# Patient Record
Sex: FEMALE | Race: WHITE
[De-identification: names, ages, dates, MRNs, and addresses within clinical notes are randomized per-mention and may not be internally consistent; named-entity substitution may affect disease eponyms.]

---

## 2020-09-04 ENCOUNTER — HOSPITAL ENCOUNTER (OUTPATIENT)
Age: 65
End: 2020-09-04
Payer: MEDICARE

## 2020-09-04 DIAGNOSIS — Z12.31: Primary | ICD-10-CM

## 2020-09-04 PROCEDURE — 77063 BREAST TOMOSYNTHESIS BI: CPT

## 2020-09-04 PROCEDURE — 77067 SCR MAMMO BI INCL CAD: CPT

## 2021-06-27 ENCOUNTER — HOSPITAL ENCOUNTER (EMERGENCY)
Age: 66
Discharge: HOME | End: 2021-06-27
Payer: MEDICARE

## 2021-06-27 VITALS
TEMPERATURE: 98.78 F | DIASTOLIC BLOOD PRESSURE: 87 MMHG | OXYGEN SATURATION: 100 % | RESPIRATION RATE: 20 BRPM | SYSTOLIC BLOOD PRESSURE: 158 MMHG | HEART RATE: 84 BPM

## 2021-06-27 DIAGNOSIS — N30.00: Primary | ICD-10-CM

## 2021-06-27 DIAGNOSIS — E03.9: ICD-10-CM

## 2021-06-27 PROCEDURE — G0463 HOSPITAL OUTPT CLINIC VISIT: HCPCS

## 2021-06-27 PROCEDURE — 87086 URINE CULTURE/COLONY COUNT: CPT

## 2021-06-27 PROCEDURE — 99213 OFFICE O/P EST LOW 20 MIN: CPT

## 2021-06-27 PROCEDURE — 81003 URINALYSIS AUTO W/O SCOPE: CPT

## 2021-06-29 ENCOUNTER — HOSPITAL ENCOUNTER (OUTPATIENT)
Age: 66
Discharge: HOME | End: 2021-06-29
Payer: MEDICARE

## 2021-06-29 DIAGNOSIS — R20.2: Primary | ICD-10-CM

## 2021-06-29 DIAGNOSIS — M47.896: ICD-10-CM

## 2021-06-29 PROCEDURE — 72148 MRI LUMBAR SPINE W/O DYE: CPT

## 2021-07-09 ENCOUNTER — HOSPITAL ENCOUNTER (OUTPATIENT)
Age: 66
End: 2021-07-09
Payer: MEDICARE

## 2021-07-09 DIAGNOSIS — R10.2: Primary | ICD-10-CM

## 2021-07-09 PROCEDURE — 76856 US EXAM PELVIC COMPLETE: CPT

## 2021-07-09 PROCEDURE — 76830 TRANSVAGINAL US NON-OB: CPT

## 2021-08-03 ENCOUNTER — HOSPITAL ENCOUNTER (OUTPATIENT)
Age: 66
End: 2021-08-03
Payer: MEDICARE

## 2021-08-03 DIAGNOSIS — M81.0: Primary | ICD-10-CM

## 2021-08-03 DIAGNOSIS — E03.9: ICD-10-CM

## 2021-08-03 PROCEDURE — 77080 DXA BONE DENSITY AXIAL: CPT

## 2021-08-03 PROCEDURE — 76536 US EXAM OF HEAD AND NECK: CPT

## 2021-09-07 ENCOUNTER — HOSPITAL ENCOUNTER (OUTPATIENT)
Age: 66
End: 2021-09-07
Payer: MEDICARE

## 2021-09-07 DIAGNOSIS — Z12.31: Primary | ICD-10-CM

## 2021-09-07 PROCEDURE — 77067 SCR MAMMO BI INCL CAD: CPT

## 2021-09-07 PROCEDURE — 77063 BREAST TOMOSYNTHESIS BI: CPT

## 2022-03-10 ENCOUNTER — APPOINTMENT (RX ONLY)
Dept: URBAN - METROPOLITAN AREA CLINIC 53 | Facility: CLINIC | Age: 67
Setting detail: DERMATOLOGY
End: 2022-03-10

## 2022-03-10 DIAGNOSIS — L82.0 INFLAMED SEBORRHEIC KERATOSIS: ICD-10-CM | Status: INADEQUATELY CONTROLLED

## 2022-03-10 DIAGNOSIS — B07.8 OTHER VIRAL WARTS: ICD-10-CM | Status: INADEQUATELY CONTROLLED

## 2022-03-10 DIAGNOSIS — L82.1 OTHER SEBORRHEIC KERATOSIS: ICD-10-CM | Status: INADEQUATELY CONTROLLED

## 2022-03-10 DIAGNOSIS — D485 NEOPLASM OF UNCERTAIN BEHAVIOR OF SKIN: ICD-10-CM

## 2022-03-10 DIAGNOSIS — L20.89 OTHER ATOPIC DERMATITIS: ICD-10-CM | Status: INADEQUATELY CONTROLLED

## 2022-03-10 DIAGNOSIS — D18.0 HEMANGIOMA: ICD-10-CM

## 2022-03-10 PROBLEM — D18.01 HEMANGIOMA OF SKIN AND SUBCUTANEOUS TISSUE: Status: ACTIVE | Noted: 2022-03-10

## 2022-03-10 PROBLEM — D48.5 NEOPLASM OF UNCERTAIN BEHAVIOR OF SKIN: Status: ACTIVE | Noted: 2022-03-10

## 2022-03-10 PROCEDURE — ? PRESCRIPTION

## 2022-03-10 PROCEDURE — ? COUNSELING

## 2022-03-10 PROCEDURE — ? BENIGN DESTRUCTION

## 2022-03-10 PROCEDURE — ? SHAVE REMOVAL

## 2022-03-10 PROCEDURE — 99203 OFFICE O/P NEW LOW 30 MIN: CPT | Mod: 25

## 2022-03-10 PROCEDURE — 17110 DESTRUCTION B9 LES UP TO 14: CPT

## 2022-03-10 PROCEDURE — 11305 SHAVE SKIN LESION 0.5 CM/<: CPT | Mod: 59

## 2022-03-10 PROCEDURE — ? LIQUID NITROGEN

## 2022-03-10 RX ORDER — GENTAMICIN SULFATE 1 MG/G
OINTMENT TOPICAL ONCE DAILY
Qty: 15 | Refills: 0 | Status: ERX | COMMUNITY
Start: 2022-03-10

## 2022-03-10 RX ORDER — AMMONIUM LACTATE 12 G/100G
LOTION TOPICAL
Qty: 225 | Refills: 0 | Status: ERX | COMMUNITY
Start: 2022-03-10

## 2022-03-10 RX ORDER — HALOBETASOL PROPIONATE 0.5 MG/G
CREAM TOPICAL
Qty: 15 | Refills: 0 | Status: ERX | COMMUNITY
Start: 2022-03-10

## 2022-03-10 RX ADMIN — GENTAMICIN SULFATE: 1 OINTMENT TOPICAL at 00:00

## 2022-03-10 RX ADMIN — AMMONIUM LACTATE: 12 LOTION TOPICAL at 00:00

## 2022-03-10 RX ADMIN — HALOBETASOL PROPIONATE: 0.5 CREAM TOPICAL at 00:00

## 2022-03-10 ASSESSMENT — LOCATION SIMPLE DESCRIPTION DERM
LOCATION SIMPLE: LEFT CHEEK
LOCATION SIMPLE: RIGHT ANTERIOR NECK
LOCATION SIMPLE: LEFT KNEE
LOCATION SIMPLE: LEFT POSTERIOR THIGH

## 2022-03-10 ASSESSMENT — LOCATION DETAILED DESCRIPTION DERM
LOCATION DETAILED: LEFT KNEE
LOCATION DETAILED: RIGHT INFERIOR LATERAL NECK
LOCATION DETAILED: LEFT DISTAL POSTERIOR THIGH
LOCATION DETAILED: LEFT SUPERIOR LATERAL MALAR CHEEK

## 2022-03-10 ASSESSMENT — LOCATION ZONE DERM
LOCATION ZONE: LEG
LOCATION ZONE: FACE
LOCATION ZONE: NECK

## 2022-03-10 NOTE — PROCEDURE: MIPS QUALITY
Quality 226: Preventive Care And Screening: Tobacco Use: Screening And Cessation Intervention: Tobacco Screening not Performed for Unknown Reasons
Detail Level: Detailed
Quality 431: Preventive Care And Screening: Unhealthy Alcohol Use - Screening: Patient not identified as an unhealthy alcohol user when screened for unhealthy alcohol use using a systematic screening method
Quality 110: Preventive Care And Screening: Influenza Immunization: Influenza immunization was not ordered or administered, reason not given

## 2022-03-10 NOTE — PROCEDURE: LIQUID NITROGEN
Render Post Care In The Note?: yes
Total Number Of Lesions Treated: 1
Include Z78.9 (Other Specified Conditions Influencing Health Status) As An Associated Diagnosis?: No
Post-Care Instructions: I reviewed with the patient in detail post-care instructions. Patient is to wear sunprotection, and avoid picking at any of the treated lesions. Pt may apply Vaseline to crusted or scabbing areas.
Number Of Freeze-Thaw Cycles: 2 freeze-thaw cycles
Detail Level: Zone
Spray Paint Text: The liquid nitrogen was applied to the skin utilizing a spray paint frosting technique.
Duration Of Freeze Thaw-Cycle (Seconds): 0
Medical Necessity Information: It is in your best interest to select a reason for this procedure from the list below. All of these items fulfill various CMS LCD requirements except the new and changing color options.
Consent: The patient's consent was obtained including but not limited to risks of crusting, scabbing, blistering, scarring, darker or lighter pigmentary change, recurrence, incomplete removal and infection.
Medical Necessity Clause: This procedure was medically necessary because the lesions that were treated were: bleeding, irritated, growing , catching on clothing

## 2022-03-10 NOTE — PROCEDURE: BENIGN DESTRUCTION
Add 52 Modifier (Optional): no
Consent: The patient's consent was obtained including but not limited to risks of crusting, scabbing, blistering, scarring, darker or lighter pigmentary change, recurrence, incomplete removal and infection.
Detail Level: Zone
Medical Necessity Information: It is in your best interest to select a reason for this procedure from the list below. All of these items fulfill various CMS LCD requirements except the new and changing color options.
Anesthesia Volume In Cc: 0.5
Medical Necessity Clause: This procedure was medically necessary because the lesions that were treated were:
Treatment Number (Will Not Render If 0): 0
Render Post-Care Instructions In Note?: yes
Post-Care Instructions: I reviewed with the patient in detail post-care instructions. Patient is to wear sunprotection, and avoid picking at any of the treated lesions. Pt may apply Vaseline to crusted or scabbing areas.

## 2022-03-24 ENCOUNTER — APPOINTMENT (RX ONLY)
Dept: URBAN - METROPOLITAN AREA CLINIC 53 | Facility: CLINIC | Age: 67
Setting detail: DERMATOLOGY
End: 2022-03-24

## 2022-03-24 DIAGNOSIS — D22 MELANOCYTIC NEVI: ICD-10-CM

## 2022-03-24 DIAGNOSIS — L91.8 OTHER HYPERTROPHIC DISORDERS OF THE SKIN: ICD-10-CM

## 2022-03-24 DIAGNOSIS — L57.0 ACTINIC KERATOSIS: ICD-10-CM

## 2022-03-24 PROBLEM — D22.9 MELANOCYTIC NEVI, UNSPECIFIED: Status: ACTIVE | Noted: 2022-03-24

## 2022-03-24 PROCEDURE — 99213 OFFICE O/P EST LOW 20 MIN: CPT | Mod: 25

## 2022-03-24 PROCEDURE — ? PATHOLOGY DISCUSSION

## 2022-03-24 PROCEDURE — ? SKIN TAG REMOVAL

## 2022-03-24 PROCEDURE — 11200 RMVL SKIN TAGS UP TO&INC 15: CPT

## 2022-03-24 PROCEDURE — ? COUNSELING

## 2022-03-24 ASSESSMENT — LOCATION DETAILED DESCRIPTION DERM: LOCATION DETAILED: RIGHT CENTRAL EYEBROW

## 2022-03-24 ASSESSMENT — LOCATION ZONE DERM: LOCATION ZONE: FACE

## 2022-03-24 ASSESSMENT — LOCATION SIMPLE DESCRIPTION DERM: LOCATION SIMPLE: RIGHT EYEBROW

## 2022-03-24 NOTE — PROCEDURE: MIPS QUALITY
Quality 431: Preventive Care And Screening: Unhealthy Alcohol Use - Screening: Patient not identified as an unhealthy alcohol user when screened for unhealthy alcohol use using a systematic screening method
Detail Level: Generalized
Quality 110: Preventive Care And Screening: Influenza Immunization: Influenza immunization was not ordered or administered, reason not given
Quality 226: Preventive Care And Screening: Tobacco Use: Screening And Cessation Intervention: Tobacco Screening not Performed for Unknown Reasons

## 2022-03-24 NOTE — PROCEDURE: SKIN TAG REMOVAL
Medical Necessity Information: It is in your best interest to select a reason for this procedure from the list below. All of these items fulfill various CMS LCD requirements except the new and changing color options.
Hemostasis: Drysol
Detail Level: Detailed
Include Z78.9 (Other Specified Conditions Influencing Health Status) As An Associated Diagnosis?: No
Consent: Written consent obtained and the risks of skin tag removal was reviewed with the patient including but not limited to bleeding, pigmentary change, infection, pain, and remote possibility of scarring.
Anesthesia Type: 1% lidocaine with epinephrine
Medical Necessity Clause: This procedure was medically necessary because the lesions that were treated were: itchy and catching on patient's clothing
Add Associated Diagnoses If Applicable When Selecting Medical Necessity: Yes
Anesthesia Volume In Cc: 0.5

## 2023-07-28 ENCOUNTER — HOSPITAL ENCOUNTER (OUTPATIENT)
Age: 68
End: 2023-07-28
Payer: MEDICARE

## 2023-07-28 DIAGNOSIS — Z12.31: Primary | ICD-10-CM

## 2023-07-28 PROCEDURE — 77063 BREAST TOMOSYNTHESIS BI: CPT

## 2023-07-28 PROCEDURE — 77067 SCR MAMMO BI INCL CAD: CPT

## 2023-11-24 ENCOUNTER — HOSPITAL ENCOUNTER (OUTPATIENT)
Age: 68
End: 2023-11-24
Payer: MEDICARE

## 2023-11-24 DIAGNOSIS — M81.0: Primary | ICD-10-CM

## 2023-11-24 PROCEDURE — 77080 DXA BONE DENSITY AXIAL: CPT

## 2023-12-08 ENCOUNTER — HOSPITAL ENCOUNTER (OUTPATIENT)
Dept: HOSPITAL 102 - ANHED | Age: 68
Setting detail: OBSERVATION
LOS: 1 days | Discharge: HOME | End: 2023-12-09
Payer: MEDICARE

## 2023-12-08 VITALS — RESPIRATION RATE: 15 BRPM | HEART RATE: 83 BPM | OXYGEN SATURATION: 95 %

## 2023-12-08 VITALS
SYSTOLIC BLOOD PRESSURE: 158 MMHG | HEART RATE: 104 BPM | DIASTOLIC BLOOD PRESSURE: 82 MMHG | OXYGEN SATURATION: 98 % | RESPIRATION RATE: 20 BRPM

## 2023-12-08 VITALS
DIASTOLIC BLOOD PRESSURE: 86 MMHG | RESPIRATION RATE: 14 BRPM | HEART RATE: 94 BPM | OXYGEN SATURATION: 98 % | SYSTOLIC BLOOD PRESSURE: 154 MMHG

## 2023-12-08 VITALS — RESPIRATION RATE: 14 BRPM | HEART RATE: 86 BPM | OXYGEN SATURATION: 97 %

## 2023-12-08 VITALS — OXYGEN SATURATION: 98 % | RESPIRATION RATE: 16 BRPM | HEART RATE: 97 BPM

## 2023-12-08 VITALS
HEART RATE: 104 BPM | DIASTOLIC BLOOD PRESSURE: 95 MMHG | OXYGEN SATURATION: 100 % | RESPIRATION RATE: 16 BRPM | SYSTOLIC BLOOD PRESSURE: 171 MMHG | TEMPERATURE: 98.06 F

## 2023-12-08 VITALS — OXYGEN SATURATION: 99 % | HEART RATE: 107 BPM | RESPIRATION RATE: 22 BRPM

## 2023-12-08 VITALS
HEART RATE: 83 BPM | SYSTOLIC BLOOD PRESSURE: 128 MMHG | DIASTOLIC BLOOD PRESSURE: 69 MMHG | OXYGEN SATURATION: 97 % | RESPIRATION RATE: 17 BRPM

## 2023-12-08 VITALS — RESPIRATION RATE: 15 BRPM | HEART RATE: 82 BPM | OXYGEN SATURATION: 95 %

## 2023-12-08 VITALS — DIASTOLIC BLOOD PRESSURE: 70 MMHG | SYSTOLIC BLOOD PRESSURE: 155 MMHG | HEART RATE: 70 BPM

## 2023-12-08 VITALS — OXYGEN SATURATION: 99 % | RESPIRATION RATE: 13 BRPM | HEART RATE: 95 BPM

## 2023-12-08 VITALS
DIASTOLIC BLOOD PRESSURE: 76 MMHG | SYSTOLIC BLOOD PRESSURE: 155 MMHG | OXYGEN SATURATION: 97 % | HEART RATE: 103 BPM | RESPIRATION RATE: 15 BRPM

## 2023-12-08 VITALS — RESPIRATION RATE: 14 BRPM | OXYGEN SATURATION: 99 % | HEART RATE: 73 BPM

## 2023-12-08 VITALS — OXYGEN SATURATION: 98 % | HEART RATE: 98 BPM | RESPIRATION RATE: 12 BRPM

## 2023-12-08 VITALS — OXYGEN SATURATION: 97 % | RESPIRATION RATE: 21 BRPM | HEART RATE: 97 BPM

## 2023-12-08 VITALS
SYSTOLIC BLOOD PRESSURE: 175 MMHG | DIASTOLIC BLOOD PRESSURE: 99 MMHG | HEART RATE: 103 BPM | OXYGEN SATURATION: 100 % | RESPIRATION RATE: 16 BRPM | TEMPERATURE: 98 F

## 2023-12-08 VITALS — OXYGEN SATURATION: 95 % | RESPIRATION RATE: 12 BRPM | HEART RATE: 88 BPM

## 2023-12-08 VITALS — OXYGEN SATURATION: 99 % | HEART RATE: 108 BPM | RESPIRATION RATE: 15 BRPM

## 2023-12-08 VITALS — OXYGEN SATURATION: 99 % | HEART RATE: 96 BPM | RESPIRATION RATE: 14 BRPM

## 2023-12-08 VITALS — BODY MASS INDEX: 21.4 KG/M2

## 2023-12-08 DIAGNOSIS — R03.0: ICD-10-CM

## 2023-12-08 DIAGNOSIS — Z20.822: ICD-10-CM

## 2023-12-08 DIAGNOSIS — Z79.899: ICD-10-CM

## 2023-12-08 DIAGNOSIS — M81.0: ICD-10-CM

## 2023-12-08 DIAGNOSIS — R42: Primary | ICD-10-CM

## 2023-12-08 DIAGNOSIS — R94.31: ICD-10-CM

## 2023-12-08 DIAGNOSIS — E03.9: ICD-10-CM

## 2023-12-08 DIAGNOSIS — Z79.890: ICD-10-CM

## 2023-12-08 LAB
ADD MANUAL DIFF: NO
ADD URINE MICROSCOPIC?: YES
ALANINE AMINOTRANSFERASE: 14 U/L (ref 6–35)
ALBUMIN LEVEL: 4.6 G/DL (ref 3.5–5.1)
ALBUMIN SPEC-MCNC: 4.6 G/DL (ref 3.5–5.1)
ALKALINE PHOSPHATASE: 81 U/L (ref 38–126)
ALP SERPL-CCNC: 81 U/L (ref 38–126)
ALT SERPL-CCNC: 14 U/L (ref 6–35)
ANION GAP: 8 MMOL/L (ref 8–16)
ASPARTATE AMINO TRANSFERASE: 23 U/L (ref 14–36)
AST SERPL-CCNC: 23 U/L (ref 14–36)
BASIC METABOLIC AND HEMATOCRIT PNL BLD: 39.9 % (ref 37–47)
BILIRUBIN,TOTAL: 0.6 MG/DL (ref 0.2–1.3)
BLOOD UREA NITROGEN: 10 MG/DL (ref 7–17)
BUN SERPL-MCNC: 10 MG/DL (ref 7–17)
CALCIUM: 9 MG/DL (ref 8.4–10.2)
CARBON DIOXIDE: 24 MMOL/L (ref 22–30)
CASTS #/AREA URNS HPF: (no result) /[HPF]
CHLORIDE SPEC-MCNC: 104 MMOL/L (ref 98–107)
CHLORIDE: 104 MMOL/L (ref 98–107)
CREAT CL PREDICTED SERPL C-G-VRATE: 67 ML/MIN
ESTIMATED CRCL CALCULATION: 67 ML/MIN
ESTIMATED GLOMERULAR FILT RATE: > 60
GFRSERPLBLD MDRD-ARVRAT: > 60 ML/MIN/{1.73_M2}
GLUCOSE SERPL-MCNC: 118 MG/DL (ref 65–110)
GLUCOSE: 118 MG/DL (ref 65–110)
HEMATOCRIT: 39.9 % (ref 37–47)
HEMOGLOBIN: 12.9 G/DL (ref 12–15)
HGB+HCT PNL BLD: 39.9 % (ref 37–47)
IMM GRANULOCYTES NFR BLD AUTO: 0.2 % (ref 0–0.5)
IPF CHARGE: (no result)
LYMPHOCYTES # SPEC AUTO: 1.11 K/MM3 (ref 0.9–3.2)
MAGNESIUM: 2.1 MG/DL (ref 1.6–2.3)
MCV RBC: 91.5 FL (ref 80–100)
MEAN CORPUSCULAR HEMOGLOBIN: 29.6 PG (ref 26–34)
MEAN CORPUSCULAR HGB CONC: 32.3 G/DL (ref 32–36)
MICRO URNS: YES
NUCLEATED RED BLOOD CELLS ABSOLUTE AUTO: 0 K/MM3 (ref 0–0.01)
NUCLEATED RED BLOOD CELLS PERC: 0 % (ref 0–0.2)
PLATELET COUNT RESULT: 252 K/MM3 (ref 150–375)
POTASSIUM: 3.4 MMOL/L (ref 3.4–5)
PROT SERPL-MCNC: 8 G/DL (ref 6.3–8.2)
RED BLOOD COUNT: 4.36 M/MM3 (ref 4.2–5.4)
SODIUM: 136 MMOL/L (ref 137–145)
SP GR UR: 1.01 (ref 1–1.03)
TOTAL PROTEIN: 8 G/DL (ref 6.3–8.2)
WHITE BLOOD COUNT: 6.1 K/MM3 (ref 4.5–10)

## 2023-12-08 PROCEDURE — 80053 COMPREHEN METABOLIC PANEL: CPT

## 2023-12-08 PROCEDURE — 70498 CT ANGIOGRAPHY NECK: CPT

## 2023-12-08 PROCEDURE — 87637 SARSCOV2&INF A&B&RSV AMP PRB: CPT

## 2023-12-08 PROCEDURE — 85025 COMPLETE CBC W/AUTO DIFF WBC: CPT

## 2023-12-08 PROCEDURE — 36415 COLL VENOUS BLD VENIPUNCTURE: CPT

## 2023-12-08 PROCEDURE — 96361 HYDRATE IV INFUSION ADD-ON: CPT

## 2023-12-08 PROCEDURE — A9577 INJ MULTIHANCE: HCPCS

## 2023-12-08 PROCEDURE — 96374 THER/PROPH/DIAG INJ IV PUSH: CPT

## 2023-12-08 PROCEDURE — 70496 CT ANGIOGRAPHY HEAD: CPT

## 2023-12-08 PROCEDURE — 99285 EMERGENCY DEPT VISIT HI MDM: CPT

## 2023-12-08 PROCEDURE — 70553 MRI BRAIN STEM W/O & W/DYE: CPT

## 2023-12-08 PROCEDURE — G0378 HOSPITAL OBSERVATION PER HR: HCPCS

## 2023-12-08 PROCEDURE — 83735 ASSAY OF MAGNESIUM: CPT

## 2023-12-08 PROCEDURE — A9270 NON-COVERED ITEM OR SERVICE: HCPCS

## 2023-12-08 PROCEDURE — 81001 URINALYSIS AUTO W/SCOPE: CPT

## 2023-12-08 PROCEDURE — 93005 ELECTROCARDIOGRAM TRACING: CPT

## 2023-12-08 PROCEDURE — 70450 CT HEAD/BRAIN W/O DYE: CPT

## 2023-12-08 RX ADMIN — DIAZEPAM 5 MG: 5 TABLET ORAL at 21:17

## 2023-12-08 RX ADMIN — SODIUM CHLORIDE 999 ML: 900 INJECTION, SOLUTION INTRAVENOUS at 20:07

## 2023-12-08 RX ADMIN — SODIUM CHLORIDE 999 ML: 900 INJECTION, SOLUTION INTRAVENOUS at 18:17

## 2023-12-08 RX ADMIN — MECLIZINE HYDROCHLORIDE 25 MG: 25 TABLET ORAL at 18:17

## 2023-12-08 RX ADMIN — ONDANSETRON 4 MG: 2 INJECTION INTRAMUSCULAR; INTRAVENOUS at 18:17

## 2023-12-09 VITALS
DIASTOLIC BLOOD PRESSURE: 72 MMHG | OXYGEN SATURATION: 100 % | SYSTOLIC BLOOD PRESSURE: 137 MMHG | HEART RATE: 68 BPM | RESPIRATION RATE: 13 BRPM

## 2023-12-09 VITALS
RESPIRATION RATE: 20 BRPM | OXYGEN SATURATION: 98 % | HEART RATE: 69 BPM | TEMPERATURE: 97.8 F | DIASTOLIC BLOOD PRESSURE: 62 MMHG | SYSTOLIC BLOOD PRESSURE: 138 MMHG

## 2023-12-09 VITALS — HEART RATE: 65 BPM

## 2023-12-09 VITALS — HEART RATE: 92 BPM

## 2023-12-09 RX ADMIN — LEVOTHYROXINE SODIUM 25 MCG: 25 TABLET ORAL at 09:29

## 2023-12-09 RX ADMIN — MECLIZINE HYDROCHLORIDE 12.5 MG: 12.5 TABLET ORAL at 09:29

## 2024-10-08 NOTE — WPDANESEPPF
Anes - Initial Pre Proc Eval
Procedure: 


Operation Date: 10/09/24 07:30
Proposed Procedures
p Screening Colonoscopy - Jhon Monge MD


Date/Time: 
10/08/24  15:31

Surgeon: 
Jhon Monge MD

Pre Op Diagnosis: Neoplasm screening
Patient Data
Age: 69
Gender: F
Height: 1.63 m
Weight: 59 kg

Allergies

Allergy/AdvReac Type Severity Reaction Status Date / Time
codeine Allergy Severe TACHYCARDIA Verified 10/09/24 06:16


Home Medications

 Medication  Instructions  Recorded  Confirmed  Type
levothyroxine 25 mcg tablet 25 mcg PO DAILY 05/16/23 10/09/24 History
estradiol 0.01% (0.1 mg/gram) 1 g vaginal 2XW #42.5 grams 08/13/24 10/09/24 Rx
vaginal cream    


Patient hx anesthesia problems: none
Family hx anesthesia problems: none
Results Review: 
All pre-operative results and documents have been reviewed as part of the pre-operative evaluation.


[facility]
Past Medical History
Medical History (Updated 12/09/23 @ 10:51 by Mery Willis PA-C)

Hypothyroidism
Osteoporosis


Surgical History
Surgical History (Reviewed 12/09/23 @ 10:50 by Mery Willis PA-C)

Finger joint replacement of right hand
 Right thumb
H/O: hysterectomy
History of appendectomy
History of orthopedic surgery
 Left wrist
History of tonsillectomy
Hx of hernia repair


Family History
Family History (Reviewed 12/09/23 @ 10:50 by Mery Willis PA-C)
Mother
 Breast cancer
Father
 Carcinoma of colon
 Heart disease


Social History
Social History (Reviewed 12/09/23 @ 10:50 by Mery Willis PA-C)
Smoking status:  Former smoker 
Second hand tobacco smoke exposure:  No 
Alcohol intake:  never 
Substance use:  never 
Substance use type:  does not use 
Do You Feel Safe in your Home?:  Yes 
Lack of Transportation:  No 
Lack of Food:  Never True 
Current Housing:  I Have Housing 
Concerned About Future Housing:  No 
Difficulty Paying Gas/Electric Bills:  No 
Difficulty Paying for Meds:  No 
Currently Unemployed:  No 
Education:  Associate Degree 
Difficulty w/ Childcare or Family Care:  No 
Living arrangements:  with family 
Occupation/Education:  retired 
Gender identity (if verbalized by the patient):  Female 
Sexual Orientation (if Verbalized by the Patient):  Straight or Heterosexual 
Spiritual care concerns:  No 



Anes - Eval Final PreProcedure
Day of Procedure
10/08/24  15:31

Patient weight: normal
Heart: regular rate and rhythm
Lungs: clear to auscultation and normal air movement
Airway: Mallampati scale class II
Neurological: alert and oriented
Last oral intake: >/= 8 hours
ASA classification: II
Emergent: no
Anesthetic plan: proceed
Anesthesia type and monitoring: general GIVS and standard monitoring
Results Review: 
All pre-operative results and documents have been reviewed as part of the pre-operative evaluation.

Informed Consent: 
The patient's anesthetic plan and its attendant risks and benefits were discussed with the patient/family/POA. Questions were solicited and answers provided to the satisfaction of the patient/family/POA.

## 2024-10-08 NOTE — P.PNAN_ITS
Anes - Initial Pre Proc Eval    
Procedure:     
    
    
Operation Date: 10/09/24 07:30    
Proposed Procedures    
p Screening Colonoscopy - Jhon Monge MD    
    
    
Date/Time:     
10/08/24  15:31    
    
Surgeon:     
Jhon Monge MD    
    
Pre Op Diagnosis: Neoplasm screening    
Patient Data    
Age: 69    
Gender: F    
Height: 1.63 m    
Weight: 59 kg    
    
                                    Allergies    
    
    
    
Allergy/AdvReac Type Severity Reaction Status Date / Time    
     
codeine Allergy Severe TACHYCARDIA Verified 10/09/24 06:16    
    
    
    
                                Home Medications    
    
    
    
 Medication  Instructions  Recorded  Confirmed  Type    
     
levothyroxine 25 mcg tablet 25 mcg PO DAILY 05/16/23 10/09/24 History    
     
estradiol 0.01% (0.1 mg/gram) 1 g vaginal 2XW #42.5 grams 08/13/24 10/09/24 Rx    
    
vaginal cream        
    
    
    
Patient hx anesthesia problems: none    
Family hx anesthesia problems: none    
Results Review:     
All pre-operative results and documents have been reviewed as part of the pre-  
operative evaluation.    
    
    
[facility]    
Past Medical History    
Medical History (Updated 12/09/23 @ 10:51 by Mery Willis PA-C)    
    
Hypothyroidism    
Osteoporosis    
    
    
Surgical History    
Surgical History (Reviewed 12/09/23 @ 10:50 by Mery Willis PA-C)    
    
Finger joint replacement of right hand    
   Right thumb    
H/O: hysterectomy    
History of appendectomy    
History of orthopedic surgery    
   Left wrist    
History of tonsillectomy    
Hx of hernia repair    
    
    
Family History    
Family History (Reviewed 12/09/23 @ 10:50 by Mery Willis PA-C)    
Mother   Breast cancer    
Father   Carcinoma of colon    
   Heart disease    
    
    
Social History    
Social History (Reviewed 12/09/23 @ 10:50 by Mery Willis PA-C)    
Smoking status:  Former smoker     
Second hand tobacco smoke exposure:  No     
Alcohol intake:  never     
Substance use:  never     
Substance use type:  does not use     
Do You Feel Safe in your Home?:  Yes     
Lack of Transportation:  No     
Lack of Food:  Never True     
Current Housing:  I Have Housing     
Concerned About Future Housing:  No     
Difficulty Paying Gas/Electric Bills:  No     
Difficulty Paying for Meds:  No     
Currently Unemployed:  No     
Education:  Associate Degree     
Difficulty w/ Childcare or Family Care:  No     
Living arrangements:  with family     
Occupation/Education:  retired     
Gender identity (if verbalized by the patient):  Female     
Sexual Orientation (if Verbalized by the Patient):  Straight or Heterosexual     
Spiritual care concerns:  No     
    
    
    
Anes - Eval Final PreProcedure    
Day of Procedure    
10/08/24  15:31    
    
Patient weight: normal    
Heart: regular rate and rhythm    
Lungs: clear to auscultation and normal air movement    
Airway: Mallampati scale class II    
Neurological: alert and oriented    
Last oral intake: >/= 8 hours    
ASA classification: II    
Emergent: no    
Anesthetic plan: proceed    
Anesthesia type and monitoring: general GIVS and standard monitoring    
Results Review:     
All pre-operative results and documents have been reviewed as part of the pre-  
operative evaluation.    
    
Informed Consent:     
The patient's anesthetic plan and its attendant risks and benefits were   
discussed with the patient/family/POA. Questions were solicited and answers   
provided to the satisfaction of the patient/family/POA.

## 2024-10-09 ENCOUNTER — HOSPITAL ENCOUNTER (OUTPATIENT)
Age: 69
Discharge: HOME | End: 2024-10-09
Payer: MEDICARE

## 2024-10-09 VITALS
RESPIRATION RATE: 16 BRPM | DIASTOLIC BLOOD PRESSURE: 93 MMHG | OXYGEN SATURATION: 100 % | HEART RATE: 114 BPM | SYSTOLIC BLOOD PRESSURE: 163 MMHG | TEMPERATURE: 99.4 F

## 2024-10-09 VITALS
HEART RATE: 99 BPM | SYSTOLIC BLOOD PRESSURE: 108 MMHG | RESPIRATION RATE: 16 BRPM | OXYGEN SATURATION: 99 % | DIASTOLIC BLOOD PRESSURE: 69 MMHG

## 2024-10-09 VITALS
OXYGEN SATURATION: 99 % | SYSTOLIC BLOOD PRESSURE: 122 MMHG | RESPIRATION RATE: 16 BRPM | DIASTOLIC BLOOD PRESSURE: 72 MMHG | HEART RATE: 85 BPM

## 2024-10-09 VITALS
RESPIRATION RATE: 16 BRPM | SYSTOLIC BLOOD PRESSURE: 136 MMHG | DIASTOLIC BLOOD PRESSURE: 75 MMHG | OXYGEN SATURATION: 100 % | HEART RATE: 74 BPM

## 2024-10-09 VITALS — BODY MASS INDEX: 22.3 KG/M2 | BODY MASS INDEX: 21.67 KG/M2

## 2024-10-09 DIAGNOSIS — Z12.11: Primary | ICD-10-CM

## 2024-10-09 DIAGNOSIS — K57.30: ICD-10-CM

## 2024-10-09 PROCEDURE — 0DJD8ZZ INSPECTION OF LOWER INTESTINAL TRACT, VIA NATURAL OR ARTIFICIAL OPENING ENDOSCOPIC: ICD-10-PCS | Performed by: INTERNAL MEDICINE

## 2024-10-09 NOTE — P.PNAN_ITS
Anes - Prog Note Post-Op    
Date/Time:     
10/09/24  09:21    
    
Cardiovascular status: normal    
Respiratory status: normal    
Airway patency: baseline    
Mental status: baseline    
Post-Op hydration status: normal    
Vital Signs:     
                                Last Vital Signs    
    
    
    
Temp  37.4 C   10/09/24 06:18    
     
Pulse  74   10/09/24 08:10    
     
Resp  16   10/09/24 08:10    
     
BP  136/75   10/09/24 08:10    
     
Pulse Ox  100   10/09/24 08:10    
     
O2 Del Method  Room Air   10/09/24 08:10    
    
    
    
Pain Score (VAS):     
0    
I/O:     
                                 Intake & Output    
    
    
    
 10/08/24 10/09/24 10/09/24    
    
 23:59 07:59 15:59    
     
Intake Total  500 100    
     
Balance  500 100    
    
    
    
Post-procedural complaints: none    
Patient Feedback:     
Patient satisfied with anesthetic care.    
    
Other Findings:     
Patient vital signs back to baseline.  Patient denies nausea and vomiting.    
Patient's pain under control.  Patient OK for discharge.

## 2024-10-09 NOTE — SUR.PREOP
Pt's pre-op VS  /95 163/93.  Antoni Salazar notified. No further orders at this time. Pt denies HTN hx and states has severe white coat syndrome.

## 2024-10-09 NOTE — WPDANESPN
Anes - Prog Note Post-Op
Date/Time: 
10/09/24  09:21

Cardiovascular status: normal
Respiratory status: normal
Airway patency: baseline
Mental status: baseline
Post-Op hydration status: normal
Vital Signs: 
Last Vital Signs

Temp  37.4 C   10/09/24 06:18
Pulse  74   10/09/24 08:10
Resp  16   10/09/24 08:10
BP  136/75   10/09/24 08:10
Pulse Ox  100   10/09/24 08:10
O2 Del Method  Room Air   10/09/24 08:10


Pain Score (VAS): 
0
I/O: 
Intake & Output

 10/08/24 10/09/24 10/09/24
 23:59 07:59 15:59
Intake Total  500 100
Balance  500 100


Post-procedural complaints: none
Patient Feedback: 
Patient satisfied with anesthetic care.

Other Findings: 
Patient vital signs back to baseline.  Patient denies nausea and vomiting.  Patient's pain under control.  Patient OK for discharge.
Patient/Caregiver provided printed discharge information.

## 2024-10-09 NOTE — PM.HPGS
History of Present Illness
History of Present Illness
Consent: 
Risks, benefits, and alternatives have been discussed and questions answered.  Patient agrees to proceed with procedure.

Chief complaint: Neoplasm screening
Narrative: 
Afua Eubanks is a 69 year old female presents for screening colonoscopy.  Patient reports that her weight appetite and bowel movements are normal.  She denies abdominal pain.  This patient has had no bleeding.  Family history is noncontributory.


Review of Systems
Review of Systems:   All systems reviewed & are unremarkable except as noted in HPI and below

PMFSH
Past Medical History
Medical History (Updated 10/09/24 @ 07:13 by Jhon Monge MD)

Hypothyroidism
Osteoporosis


Surgical History
Surgical History (Reviewed 12/09/23 @ 10:50 by Mery Willis PA-C)

Finger joint replacement of right hand
 Right thumb
H/O: hysterectomy
History of appendectomy
History of orthopedic surgery
 Left wrist
History of tonsillectomy
Hx of hernia repair


Family History
Family History (Reviewed 12/09/23 @ 10:50 by Mery Willis PA-C)
Mother
 Breast cancer
Father
 Carcinoma of colon
 Heart disease


Social History
Social History (Reviewed 12/09/23 @ 10:50 by Mery Willis PA-C)
Smoking status:  Former smoker 
Second hand tobacco smoke exposure:  No 
Alcohol intake:  never 
Substance use:  never 
Substance use type:  does not use 
Do You Feel Safe in your Home?:  Yes 
Lack of Transportation:  No 
Lack of Food:  Never True 
Current Housing:  I Have Housing 
Concerned About Future Housing:  No 
Difficulty Paying Gas/Electric Bills:  No 
Difficulty Paying for Meds:  No 
Currently Unemployed:  No 
Education:  Associate Degree 
Difficulty w/ Childcare or Family Care:  No 
Living arrangements:  with family 
Occupation/Education:  retired 
Gender identity (if verbalized by the patient):  Female 
Sexual Orientation (if Verbalized by the Patient):  Straight or Heterosexual 
Spiritual care concerns:  No 



Meds
Home Medications and Allergies
Home Medications

 Medication  Instructions  Recorded  Confirmed  Type
levothyroxine 25 mcg tablet 25 mcg PO DAILY 05/16/23 10/09/24 History
estradiol 0.01% (0.1 mg/gram) 1 g vaginal 2XW #42.5 grams 08/13/24 10/09/24 Rx
vaginal cream    


Allergies

Allergy/AdvReac Type Severity Reaction Status Date / Time
codeine Allergy Severe TACHYCARDIA Verified 10/09/24 06:16


Vital Signs
Vital Signs - 24 hr

 10/09/24
06:18
Temperature 99.4 F
Pulse Rate 114 H
Respiratory Rate 16
Blood Pressure 163/93 H
Pulse Oximetry 100
Oxygen Delivery Room Air



Exam
Narrative:   
Physical exam reveals the patient to be alert.  Signs stable.  HEENT exam unremarkable.  Patient anicteric.  Lungs are clear.  Heart without murmur.  Abdomen bowel sounds are present soft nontender with no organomegaly.  Digital external rectal exam 
normal.
 

Assessment and Plan
Assessment and plan
(1) Screen for colon cancer: 
      Code(s):
Z12.11 - Encounter for screening for malignant neoplasm of colon
      Status: Acute
      Assessment and Plan: 
Patient presents for neoplasia screening.  Appears to be at average risk for colon polyps.  Further recommendations may be given after endoscopy.

## 2024-10-09 NOTE — P.HP_ITS
History of Present Illness    
History of Present Illness    
Consent:     
Risks, benefits, and alternatives have been discussed and questions answered.    
Patient agrees to proceed with procedure.    
    
Chief complaint: Neoplasm screening    
Narrative:     
Afua Eubanks is a 69 year old female presents for screening colonoscopy.    
Patient reports that her weight appetite and bowel movements are normal.  She   
denies abdominal pain.  This patient has had no bleeding.  Family history is no  
ncontributory.    
    
    
Review of Systems    
    
    
Review of Systems:   All systems reviewed & are unremarkable except as noted in   
HPI and below      
    
    
PMFSH    
Past Medical History    
Medical History (Updated 10/09/24 @ 07:13 by Jhon Monge MD)    
    
Hypothyroidism    
Osteoporosis    
    
    
Surgical History    
Surgical History (Reviewed 12/09/23 @ 10:50 by Mery Willis PA-C)    
    
Finger joint replacement of right hand    
   Right thumb    
H/O: hysterectomy    
History of appendectomy    
History of orthopedic surgery    
   Left wrist    
History of tonsillectomy    
Hx of hernia repair    
    
    
Family History    
Family History (Reviewed 12/09/23 @ 10:50 by Mery Willis PA-C)    
Mother   Breast cancer    
Father   Carcinoma of colon    
   Heart disease    
    
    
Social History    
Social History (Reviewed 12/09/23 @ 10:50 by Mery Willis PA-C)    
Smoking status:  Former smoker     
Second hand tobacco smoke exposure:  No     
Alcohol intake:  never     
Substance use:  never     
Substance use type:  does not use     
Do You Feel Safe in your Home?:  Yes     
Lack of Transportation:  No     
Lack of Food:  Never True     
Current Housing:  I Have Housing     
Concerned About Future Housing:  No     
Difficulty Paying Gas/Electric Bills:  No     
Difficulty Paying for Meds:  No     
Currently Unemployed:  No     
Education:  Associate Degree     
Difficulty w/ Childcare or Family Care:  No     
Living arrangements:  with family     
Occupation/Education:  retired     
Gender identity (if verbalized by the patient):  Female     
Sexual Orientation (if Verbalized by the Patient):  Straight or Heterosexual     
Spiritual care concerns:  No     
    
    
    
Meds    
Home Medications and Allergies    
                                Home Medications    
    
    
    
 Medication  Instructions  Recorded  Confirmed  Type    
     
levothyroxine 25 mcg tablet 25 mcg PO DAILY 05/16/23 10/09/24 History    
     
estradiol 0.01% (0.1 mg/gram) 1 g vaginal 2XW #42.5 grams 08/13/24 10/09/24 Rx    
    
vaginal cream        
    
    
    
                                    Allergies    
    
    
    
Allergy/AdvReac Type Severity Reaction Status Date / Time    
     
codeine Allergy Severe TACHYCARDIA Verified 10/09/24 06:16    
    
    
    
Vital Signs    
                               Vital Signs - 24 hr    
    
    
    
 10/09/24    
06:18    
     
Temperature 99.4 F    
     
Pulse Rate 114 H    
     
Respiratory Rate 16    
     
Blood Pressure 163/93 H    
     
Pulse Oximetry 100    
     
Oxygen Delivery Room Air    
    
    
    
    
Exam    
    
    
Narrative:       
Physical exam reveals the patient to be alert.  Signs stable.  HEENT exam   
unremarkable.  Patient anicteric.  Lungs are clear.  Heart without murmur.    
Abdomen bowel sounds are present soft nontender with no organomegaly.  Digital   
external rectal exam normal.    
       
    
    
Assessment and Plan    
Assessment and plan    
(1) Screen for colon cancer:     
      Code(s):    
Z12.11 - Encounter for screening for malignant neoplasm of colon    
      Status: Acute    
      Assessment and Plan:     
Patient presents for neoplasia screening.  Appears to be at average risk for   
colon polyps.  Further recommendations may be given a

## 2024-10-14 ENCOUNTER — HOSPITAL ENCOUNTER (OUTPATIENT)
Dept: HOSPITAL 102 - ANHPT | Age: 69
LOS: 63 days | Discharge: HOME | End: 2024-12-16
Payer: MEDICARE

## 2024-10-14 DIAGNOSIS — R42: Primary | ICD-10-CM

## 2024-10-14 PROCEDURE — 97530 THERAPEUTIC ACTIVITIES: CPT

## 2024-10-14 PROCEDURE — 97162 PT EVAL MOD COMPLEX 30 MIN: CPT

## 2024-10-14 PROCEDURE — 97110 THERAPEUTIC EXERCISES: CPT

## 2024-10-14 NOTE — PTOPEVAL1
Assessment and note entered by Analy Montano, PT
Evaluation Information

Assessment Status              Evaluation                                        
ICD-10 Condition Codes (PT)    BPPV H81.12                                       
Other ICD-10 Condition Codes ( dizziness R 42                                    
PT)                            
Onset                          end of July 2024                                  
Subjective Information         rolled over in bed and started having dizziness   
                               instantly- room spinning;  no longer have that;   
                               also have neck pain, hold tension in neck and     
                               shoulders;  headaches about once every 2 weeks;   
                               allergy issues/stuffy nose off/on; issue with     
                               vertigo and resolved without treatment;           
                                                                                 
                               saw ENT, instructed to start taking Flonase for 1 
                               month--it helped;                                 
                               recent hearing test: moderate hearing loss-- no   
                               recommendations;                                  
                               Meds: hypothyroid, flonase PRN;                   
                                                                                 
                               Activity: retired, active- driving and all home   
                               tasks; can do everything, even though have a      
                               little dizziness; have been doing some neck and   
                               shoulder exercises from on line;                  
                                                                                 
                               Symptoms:  sinus pressure;                        
                               No room spinning sensations                       



Reported Pain Level

Pain Score                     2:  Self Report                                   
Additional Pain Score Comments little stiff and tight in neck, most on R side    



Assessment

PT Clinical Summary            Kellie has the diagnosis of vestibular therapy.    
                               She had dizziness with room spinning in August,   
                               reports it is better now.   Flonase helped        
                               decrease it.                                      
                               Dizziness Handicap index rating of 16%.           
                                                                                 
                               History includes:  had 2 previous history of      
                               dizziness that resolved indep, without treatment; 
                               headaches, neck pain, R shoulder pain;            
                               hypothyroid, osteoporosis, hearing loss.         
                                                                                 
                               With the testing: BPPV was negative;  she does    
                               have decreased ROM of cervical rotation to R with 
                               reports of tight, stiff with all cervical motions 
                               and R shoulder decreased IR with soreness;  muscle
                               spasms over upper traps bilaterals.               
                                                                                 
                               Education to pt:  basic vestibular system, BPPV,  
                               Edgar Arredondo for self clearance of BPPV;  neck   
                               stretching, HEP and posture.  Use of heat and     
                               stretching to decrease neck pain;    Also-- to get
                               an eye exam--uses reader glasses, but have not    
                               had eye exam for over 2 years.                    
                               Discussed use of Flonase more regularly to manage 
                               sinus issues, per  instructions.                
                                                                                 
                               She will be going out of town next week for 3     
                               weeks.  She will call for appointment when she    
                               returns if she had additional needs for treatment.
                                                                                 


Plan of Care

Interventions                  Manual Therapy,Neuro Re-education,Patient/        
                                Education,Therapeutic Exercise            
PT Services Indicated          Yes                                               
Treatment Frequency and        2x/wk for 6 visits;  pt will be going out of town 
Duration                       for 3 weeks;  she will call when she returns to   
                               schedule                                          



These treatments will address the objective and functional deficits as defined above. The patient will be advanced safely and appropriately in order for the patient to progress towards his/her prior level of function. Additional exercises will be 
introduced and as well as a comprehensive home exercise program upon discharge, if needed, ?to ensure carryover of functional gains achieved in the clinic. This treatment plan has been reviewed and agreement upon by the patient.

## 2024-10-14 NOTE — OPREHPOC
Outpatient Therapy Plan of Care


          This is a Multidisciplinary Plan of Care that may contain components documented by all disciplines (PT, OT, and ST.)





PT Problem 1

PT Problem #1                  Knowledge Deficit                                 


PT Goal 1

Goal / Goal Update             *indep with HEP                                   
Target Visit                   6                                                 

PT Problem 2

PT Problem #2                  Impaired Vestibular System                         


PT Goal 1

Goal / Goal Update             pt perform without any dizziness or symptoms      
                               reported:                                         
                               1* rolling R/L in supine                          
                               2* supine/sit transfer                            
                               3* walking 50', with head motions R/L 3x each     
                               4* Dizziness Handicap Index self rating of 0%     
                               limitation in activity level                      
                               5* further assessment of vestibular system as     
                               indicated with treatment progression              
Target Visit                   6

## 2024-10-23 ENCOUNTER — HOSPITAL ENCOUNTER (OUTPATIENT)
Dept: HOSPITAL 102 - MICIMG | Age: 69
Discharge: HOME | End: 2024-10-23
Payer: MEDICARE

## 2024-10-23 DIAGNOSIS — Z12.31: Primary | ICD-10-CM

## 2024-10-23 PROCEDURE — 77067 SCR MAMMO BI INCL CAD: CPT

## 2024-10-23 PROCEDURE — 77063 BREAST TOMOSYNTHESIS BI: CPT

## 2024-12-16 NOTE — PTOPDC
Assessment and note entered by Analy Montano, PT

Assessment Status              Discharge - Pt Not Present                        
ICD-10 Condition Codes (PT)    BPPV H81.12                                       
Other ICD-10 Condition Codes ( dizziness R 42                                    
PT)                            
Onset                          end of July 2024                                  
Subjective Information         pt was not seen this date.                        



Assessment

PT Clinical Summary            Kellie was seen for PT evaluation on October 14 for
                               vertigo/vestibular therapy.                       
                               She did not call for any further treatment after  
                               the eval. 
                                       
                               Discharge PT services.  The goals were not        
                               addressed.                                        


Plan of Care

PT Services Indicated          Yes